# Patient Record
Sex: MALE | Race: WHITE | Employment: FULL TIME | URBAN - METROPOLITAN AREA
[De-identification: names, ages, dates, MRNs, and addresses within clinical notes are randomized per-mention and may not be internally consistent; named-entity substitution may affect disease eponyms.]

---

## 2024-08-06 ENCOUNTER — HOSPITAL ENCOUNTER (EMERGENCY)
Facility: HOSPITAL | Age: 54
Discharge: HOME | End: 2024-08-06
Attending: EMERGENCY MEDICINE
Payer: COMMERCIAL

## 2024-08-06 VITALS
BODY MASS INDEX: 26.73 KG/M2 | HEART RATE: 79 BPM | DIASTOLIC BLOOD PRESSURE: 107 MMHG | WEIGHT: 215 LBS | TEMPERATURE: 98 F | HEIGHT: 75 IN | RESPIRATION RATE: 18 BRPM | SYSTOLIC BLOOD PRESSURE: 175 MMHG | OXYGEN SATURATION: 99 %
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
## 2024-08-06 PROCEDURE — 99283 EMERGENCY DEPT VISIT LOW MDM: CPT

## 2024-08-06 RX ORDER — SULFAMETHOXAZOLE AND TRIMETHOPRIM 800; 160 MG/1; MG/1
1 TABLET ORAL 2 TIMES DAILY
Qty: 14 TABLET | Refills: 0 | Status: SHIPPED | OUTPATIENT
Start: 2024-08-06 | End: 2024-08-13

## 2024-08-07 NOTE — ED QUICK NOTES
Bite noted to right upper FA: patient almost positive that it was a mosquito.   Admits to itching w/ mild pain

## 2024-08-07 NOTE — DISCHARGE INSTRUCTIONS
\"You had elevated blood pressure today and you need to follow up with your doctor for a repeat blood pressure check and further discussion of lifestyle modifications that include Weight Reduction - Dietary Sodium Restriction - Increased Physical Activity and Moderation in alcohol (ETOH) Consumption. If possible check your pressure at home and keep a blood pressure log to bring to your physician.\"    Return for worsening rash or fevers  Benadryl 25 mg every 6 hours as needed

## 2024-08-07 NOTE — ED PROVIDER NOTES
Patient Seen in: [facility] Emergency Department      History     Chief Complaint   Patient presents with    Rash Skin Problem     Stated Complaint: Rash right arm    Subjective:   HPI    Patient is a 54-year-old male who presents with bug bite to right arm that occurred 2 days ago.  He thinks it is a mosquito bite but noticed it was streaking red up his arm today.  He states it is a little bit painful and itchy.  Denies any fevers.    Objective:   History reviewed. No pertinent past medical history.           History reviewed. No pertinent surgical history.             Social History     Socioeconomic History    Marital status:    Tobacco Use    Smoking status: Never    Smokeless tobacco: Never              Review of Systems    Positive for stated Chief Complaint: Rash Skin Problem    Other systems are as noted in HPI.  Constitutional and vital signs reviewed.      All other systems reviewed and negative except as noted above.    Physical Exam     ED Triage Vitals [08/06/24 2118]   BP (!) 188/116   Pulse 79   Resp 18   Temp 98 °F (36.7 °C)   Temp src    SpO2 99 %   O2 Device None (Room air)       Current Vitals:   Vital Signs  BP: (!) 175/107  Pulse: 79  Resp: 18  Temp: 98 °F (36.7 °C)    Oxygen Therapy  SpO2: 99 %  O2 Device: None (Room air)            Physical Exam    GENERAL: No acute distress, awake and alert  HEENT: EOMI, PERRL  RESP: no tachypnea or retractions  Extremities: FROM of all extremities, no edema  SKIN: quarter sized bug bite to right antecubital arm with 2 small areas of streaking going upward  Neuro: CN intact, normal speech, normal gait, 5/5 motor strength in all extremities, no focal deficits      ED Course   Labs Reviewed - No data to display       MDM           Medical Decision Making  Discussed with patient monitoring for worsening infection at home and we will treat with antibiotics and antihistamines for home.  We also discussed the dangers of elevated blood pressure and  recommend follow-up with PCP in the next 1 to 2 weeks.  He feels comfortable with plan for close follow-up at outpatient basis.    Risk  OTC drugs.  Prescription drug management.        Disposition and Plan     Clinical Impression:  1. Bug bite, initial encounter         Disposition:  Discharge  8/6/2024 10:34 pm    Follow-up:  Kas[facility] [address]  [facility] [id number] [phone]    Follow up in 1 week(s)            Medications Prescribed:  Current Discharge Medication List        START taking these medications    Details   sulfamethoxazole-trimethoprim -160 MG Oral Tab per tablet Take 1 tablet by mouth 2 (two) times daily for 7 days.  Qty: 14 tablet, Refills: 0